# Patient Record
Sex: FEMALE | URBAN - METROPOLITAN AREA
[De-identification: names, ages, dates, MRNs, and addresses within clinical notes are randomized per-mention and may not be internally consistent; named-entity substitution may affect disease eponyms.]

---

## 2022-04-05 ENCOUNTER — TELEPHONE (OUTPATIENT)
Dept: OBSTETRICS AND GYNECOLOGY | Facility: CLINIC | Age: 52
End: 2022-04-05
Payer: COMMERCIAL

## 2022-04-05 NOTE — TELEPHONE ENCOUNTER
Called patient to reschedule her appointment on Friday with Melanie. Dr. Chavez will be out of the office this day and Melanie is unable to do biopsy if needed. Kasey Booth

## 2024-05-23 ENCOUNTER — OFFICE VISIT (OUTPATIENT)
Dept: OBSTETRICS AND GYNECOLOGY | Facility: CLINIC | Age: 54
End: 2024-05-23
Payer: COMMERCIAL

## 2024-05-23 ENCOUNTER — PROCEDURE VISIT (OUTPATIENT)
Dept: OBSTETRICS AND GYNECOLOGY | Facility: CLINIC | Age: 54
End: 2024-05-23
Payer: COMMERCIAL

## 2024-05-23 VITALS
SYSTOLIC BLOOD PRESSURE: 115 MMHG | DIASTOLIC BLOOD PRESSURE: 67 MMHG | WEIGHT: 228 LBS | HEIGHT: 65 IN | BODY MASS INDEX: 37.99 KG/M2

## 2024-05-23 DIAGNOSIS — N93.9 ABNORMAL UTERINE BLEEDING: ICD-10-CM

## 2024-05-23 DIAGNOSIS — F40.298 NEEDLE PHOBIA: ICD-10-CM

## 2024-05-23 DIAGNOSIS — Z01.419 ENCOUNTER FOR WELL WOMAN EXAM WITH ROUTINE GYNECOLOGICAL EXAM: ICD-10-CM

## 2024-05-23 DIAGNOSIS — N95.0 POSTMENOPAUSAL BLEEDING: Primary | ICD-10-CM

## 2024-05-23 DIAGNOSIS — N93.9 ABNORMAL UTERINE BLEEDING: Primary | ICD-10-CM

## 2024-05-23 PROCEDURE — 99204 OFFICE O/P NEW MOD 45 MIN: CPT | Mod: 25,S$GLB,, | Performed by: OBSTETRICS & GYNECOLOGY

## 2024-05-23 PROCEDURE — 99459 PELVIC EXAMINATION: CPT | Mod: S$GLB,,, | Performed by: OBSTETRICS & GYNECOLOGY

## 2024-05-23 PROCEDURE — 76830 TRANSVAGINAL US NON-OB: CPT | Mod: S$GLB,,, | Performed by: OBSTETRICS & GYNECOLOGY

## 2024-05-23 RX ORDER — ALPRAZOLAM 0.5 MG/1
TABLET ORAL
COMMUNITY
Start: 2024-02-16 | End: 2024-05-23

## 2024-05-23 RX ORDER — LEVOTHYROXINE, LIOTHYRONINE 57; 13.5 UG/1; UG/1
TABLET ORAL
COMMUNITY
Start: 2024-04-11

## 2024-05-23 RX ORDER — TRAMADOL HYDROCHLORIDE 50 MG/1
TABLET ORAL
COMMUNITY
Start: 2024-03-22

## 2024-05-23 RX ORDER — VALACYCLOVIR HYDROCHLORIDE 500 MG/1
TABLET, FILM COATED ORAL
COMMUNITY
Start: 2024-02-12 | End: 2024-05-23

## 2024-05-23 RX ORDER — SERDEXMETHYLPHENIDATE AND DEXMETHYLPHENIDATE 7.8; 39.2 MG/1; MG/1
CAPSULE ORAL
COMMUNITY
Start: 2024-05-13

## 2024-05-23 NOTE — PROGRESS NOTES
CC:  NEW PT-problem visit (menopausal since )  Patient Care Team:  Rajiv Munroe MD as PCP - General (Internal Medicine)    NEW PATIENT       HPI:  Patient is a 53 y.o. who presents for a problem visit today. History reviewed and hasn't seen a gyn in >15 yrs but hasn't had any issues. Last cycle  and tried a pellet once when going thru menopause and didn't like it and has never used any other hrt.  5 days ago started a period and had a few small clots and still bleeding today. No new meds, vitamins, illness, or anything else that has changed recently. Doesn't have intercourse often as her  is 69 yrs old but did on may 10th but the bleeding started  and so doesnt think it is related. Sees pcp 4 times a year but he checks bloodwork only once a yr as she doesn't do well with needles. No hx of abnormal paps. Reviewed ultrasound with patient and unable to see ovaries but ems is 5mm which is just slightly thickened. Pt very anxious today and will do pap and exam today. Discussed workup and need to check thyroid and fsh and then will likely need emb but will use cytotec night prior.     HRT- had one biote pellet once many yrs ago  HX ABNL PAPS: none    REVIEW OF PRIOR DATA/ HEALTH MAINTENANCE:  LAST ANNUAL:    >15 yrs ago     LAST [facility] (screening)- 2024-  [facility]    LAST COLOGARD-  - neg- Q 3 years   LAST DEXA- never     Past Medical History:   Diagnosis Date    Disc disorder of lumbar region     slipped disc in L3-L4 region    Hashimoto's disease     Needle phobia     only can be drawn by Lorenza at Dr Sim' office     SURGICAL HX:   has a past surgical history that includes  section.    SOCIAL HX:    reports that she has never smoked. She has never used smokeless tobacco. She reports that she does not currently use alcohol. She reports that she does not use drugs.    Current Outpatient Medications   Medication Instructions    AZSTARYS 39.2 mg- 7.8 mg Cap     NP THYROID 90  "mg Tab     traMADoL (ULTRAM) 50 mg tablet      VITALS:  Blood pressure 115/67, height 5' 5" (1.651 m), weight 103.4 kg (228 lb).  Body mass index is 37.94 kg/m².     PHYSICAL EXAM-  APPEARANCE: Well appearing, in no acute distress.   CV/PULM: No resp distress, normal resp effort   PSYCH:  Normal mood and affect, cooperative   SKIN: No rashes, lesions, or abnormal bruising   ABD: Soft, without tenderness or masses.   BREAST: deferred/declined  PELVIC:  VULVA: Normal female genitalia. No lesions.   URETHRAL MEATUS: No masses, no significant prolapse.   BLADDER/ URETHRA: No masses or suprapubic tenderness   VAGINA/ CVX: No lesions. +atrophic changes. No discharge   UTERUS:  mobile, non-tender   ADNEXA: No masses, tenderness, or fullness   ANUS/ PERINEUM: Normal tone.  No lesions.           *patient verbally consented for exam and female chaperone present for entire exam     ASSESSMENT and PLAN:  Postmenopausal bleeding  -     T3, Free; Future  -     Follicle Stimulating Hormone; Future  -     T4, Free; Future  -     TSH; Future    Encounter for well woman exam with routine gynecological exam  -     Liquid-based pap smear, screening    Needle phobia     RR signed and will get las set of labs from dr zheng- if no recent thyroid or fsh , pt has orders to get drawn at his office. Once labs back will set up for emb    FOLLOWUP:  1 year for wwe or sooner prn    COUNSELING:  Patient was counseled today on recommendation for yearly pelvic exam, current Pap guidelines, self breast exams, annual screening mammograms, routine screening colonoscopy , and bone density testing.  Discussed vitamin D and calcium supplements as well as weight bearing exercise to decrease risks. Encouraged patient to see her PCP for other health maintenance.       "

## 2024-05-29 LAB — Lab: NORMAL

## 2024-06-12 NOTE — PROGRESS NOTES
Please let pt know we got her prior labs from dr zheng but she does still need to do the ones I ordered. She doesn't have to be fasting

## 2024-06-13 ENCOUNTER — TELEPHONE (OUTPATIENT)
Dept: OBSTETRICS AND GYNECOLOGY | Facility: CLINIC | Age: 54
End: 2024-06-13
Payer: COMMERCIAL

## 2024-06-13 NOTE — TELEPHONE ENCOUNTER
Called patient left voicemail for patient to have labs done that were sent to the path lab, did receive Dr. Munroe's previous labs.       [facility]